# Patient Record
Sex: MALE | Employment: FULL TIME | URBAN - METROPOLITAN AREA
[De-identification: names, ages, dates, MRNs, and addresses within clinical notes are randomized per-mention and may not be internally consistent; named-entity substitution may affect disease eponyms.]

---

## 2024-08-12 ENCOUNTER — APPOINTMENT (OUTPATIENT)
Dept: GENERAL RADIOLOGY | Age: 29
End: 2024-08-12

## 2024-08-12 ENCOUNTER — HOSPITAL ENCOUNTER (EMERGENCY)
Age: 29
Discharge: HOME | End: 2024-08-12

## 2024-08-12 VITALS
BODY MASS INDEX: 33.43 KG/M2 | HEART RATE: 88 BPM | DIASTOLIC BLOOD PRESSURE: 85 MMHG | TEMPERATURE: 98.3 F | OXYGEN SATURATION: 96 % | RESPIRATION RATE: 18 BRPM | HEIGHT: 65 IN | WEIGHT: 200.62 LBS | SYSTOLIC BLOOD PRESSURE: 131 MMHG

## 2024-08-12 DIAGNOSIS — S52.121A CLOSED DISPLACED FRACTURE OF HEAD OF RIGHT RADIUS, INITIAL ENCOUNTER: Primary | ICD-10-CM

## 2024-08-12 DIAGNOSIS — S52.691A OTHER CLOSED FRACTURE OF DISTAL END OF RIGHT ULNA, INITIAL ENCOUNTER: ICD-10-CM

## 2024-08-12 PROCEDURE — 99283 EMERGENCY DEPT VISIT LOW MDM: CPT

## 2024-08-12 PROCEDURE — 29125 APPL SHORT ARM SPLINT STATIC: CPT

## 2024-08-12 PROCEDURE — 73110 X-RAY EXAM OF WRIST: CPT

## 2024-08-12 RX ORDER — OXYCODONE HYDROCHLORIDE AND ACETAMINOPHEN 5; 325 MG/1; MG/1
1 TABLET ORAL EVERY 6 HOURS PRN
Qty: 12 TABLET | Refills: 0 | Status: SHIPPED | OUTPATIENT
Start: 2024-08-12 | End: 2024-08-15

## 2024-08-12 RX ORDER — ONDANSETRON 4 MG/1
4 TABLET, ORALLY DISINTEGRATING ORAL 3 TIMES DAILY PRN
Qty: 21 TABLET | Refills: 0 | Status: SHIPPED | OUTPATIENT
Start: 2024-08-12 | End: 2024-08-19

## 2024-08-12 ASSESSMENT — PAIN SCALES - GENERAL: PAINLEVEL_OUTOF10: 8

## 2024-08-12 ASSESSMENT — LIFESTYLE VARIABLES
HOW OFTEN DO YOU HAVE A DRINK CONTAINING ALCOHOL: MONTHLY OR LESS
HOW MANY STANDARD DRINKS CONTAINING ALCOHOL DO YOU HAVE ON A TYPICAL DAY: 1 OR 2

## 2024-08-12 ASSESSMENT — PAIN DESCRIPTION - DESCRIPTORS: DESCRIPTORS: THROBBING

## 2024-08-12 ASSESSMENT — PAIN - FUNCTIONAL ASSESSMENT: PAIN_FUNCTIONAL_ASSESSMENT: 0-10

## 2024-08-12 ASSESSMENT — PAIN DESCRIPTION - ORIENTATION: ORIENTATION: RIGHT

## 2024-08-12 NOTE — ED PROVIDER NOTES
RIGHT (MIN 3 VIEWS)   Final Result   Distal radius and ulnar fracture.         Electronically signed by Danny Phan                   No results for input(s): \"COVID19\" in the last 72 hours.     Voice dictation software was used during the making of this note.  This software is not perfect and grammatical and other typographical errors may be present.  This note has not been completely proofread for errors.       Ximena Sanders PA  08/12/24 1914

## 2024-08-12 NOTE — ED TRIAGE NOTES
Patient arrives with significant other from home. Patient states he wants an x-ray for his right wrist due to a fall that injured his right arm. Patient states he fell yesterday. Patient denies hitting his head and denies loose of consciousness.

## 2024-08-12 NOTE — DISCHARGE INSTRUCTIONS
Seen in the emergency department today for right wrist pain    You broke your distal right radius and your right ulna.    You were placed in a splint.  Keep the splint dry.  Wear it at all times.    You are given a sling.    I have written you prescription for a narcotic pain medication called Endocet.  Do not drink alcohol or drive on this medication this medication will cause grogginess, fogginess, sleepiness.    Take a stool softener if you take narcotic pain medication    You can alternate Tylenol and ibuprofen for your pain    I have written you for Zofran to help with nausea because narcotics sometimes make you nauseous.    [facility] is going to call you tomorrow so that they can see you in the office tomorrow for follow-up.    Return to an emergency department if your splint feels like it has become very tight, return if your fingers are numb, return for worsening of your condition.    Visto hoy en urgencias por dolor en la christina derecha.    Te rompiste el radio distal derecho y el cúbito derecho.    Te pusieron margi férula.  Mantenga la férula seca.  Llévalo en todo momento.    Te dan un cabestrillo.    Le he recetado un analgésico narcótico llamado Endocet.  No katrina alcohol ni conduzca con ari medicamento. Ari medicamento provocará aturdimiento, confusión y somnolencia.    [place] un ablandador de heces si suzie analgésicos narcóticos.    Puedes alternar Tylenol e ibuprofeno para tu dolor.    Le he escrito Zofran para que le ayude con las náuseas porque los narcóticos a veces le provocan náuseas.    [facility] lo llamará mañana para poder verlo en el consultorio mañana para un seguimiento.    Regrese a un departamento de emergencias si siente que serrano férula se lyon vuelto muy apretada, regrese si tiene los dedos entumecidos, regrese si serrano condición empeora.

## 2024-08-13 ENCOUNTER — OFFICE VISIT (OUTPATIENT)
Age: 29
End: 2024-08-13

## 2024-08-13 DIAGNOSIS — S62.101A CLOSED FRACTURE OF RIGHT WRIST, INITIAL ENCOUNTER: Primary | ICD-10-CM

## 2024-08-13 DIAGNOSIS — S52.571A OTHER INTRAARTICULAR FRACTURE OF LOWER END OF RIGHT RADIUS, INITIAL ENCOUNTER FOR CLOSED FRACTURE: ICD-10-CM

## 2024-08-13 PROCEDURE — 99204 OFFICE O/P NEW MOD 45 MIN: CPT | Performed by: NURSE PRACTITIONER

## 2024-08-13 NOTE — PROGRESS NOTES
Orthopaedic Hand Clinic Note    Name: Calvin Lee  Age: 29 y.o.  YOB: 1995  Gender: male  MRN: [id number]      CC: Patient referred for evaluation of right wrist pain    HPI: Calvin Lee is a 29 y.o. male right handed with a chief complaint of   right  wrist pain. The injury occurred 2 days ago when the patient fell down the stairs. The pain is located diffusely about the right wrist. Denies numbness or paresthesias of the fingers. Evaluation at the emergency room has included x-rays. Treatment to date has included sugar-tong splint and sling. Denies loss of consciousness, head injury or neck pain.  He is taking oxycodone for pain.    ROS/Meds/PSH/PMH/FH/SH: I personally reviewed the patients standard intake form.  Pertinents are discussed in the HPI    Physical Examination:  General: Awake and alert.  HEENT: Normocephalic, atraumatic  CV/Pulm: Breathing even and unlabored  Skin: No obvious rashes noted.  Lymphatic: No obvious evidence of lymphedema or lymphadenopathy    Musculoskeletal Exam:  Examination of the right upper extremity demonstrates no open wounds. Negative Tinel's over left carpal tunnel. Sensation is intact throughout, cap refill in all fingers < 5 seconds. Finger motion is limited with  moderate  swelling of the hand and fingers. Tenderness over the distal radius.  Positive  tenderness over the ulnar aspect of the wrist. No tenderness at elbow, shoulder, clavicle or cervical spine.    Dr. Spann has examined the patient    Imaging / Electrodiagnostic Tests:     XR of the right wrist, 3 views, independently reviewed and interpreted.  Patient has a nondisplaced intra-articular distal radius fracture.      Dr. Spann has reviewed x-rays    Assessment:   1. Closed fracture of right wrist, initial encounter    2. Other intraarticular fracture of lower end of right radius, initial encounter for closed fracture        Plan:   We discussed the diagnosis and different

## 2024-08-20 ENCOUNTER — OFFICE VISIT (OUTPATIENT)
Age: 29
End: 2024-08-20

## 2024-08-20 DIAGNOSIS — S62.101A CLOSED FRACTURE OF RIGHT WRIST, INITIAL ENCOUNTER: Primary | ICD-10-CM

## 2024-08-20 PROCEDURE — 99214 OFFICE O/P EST MOD 30 MIN: CPT | Performed by: ORTHOPAEDIC SURGERY

## 2024-08-20 NOTE — PROGRESS NOTES
Orthopaedic Hand Clinic Note    Name: Calvin Lee  Age: 29 y.o.  YOB: 1995  Gender: male  MRN: [id number]      Follow up visit:   1. Closed fracture of right wrist, initial encounter        HPI: Calvin Lee is a 29 y.o. male who is following up for right radius fracture, patient reports much improved pain and swelling.      ROS/Meds/PSH/PMH/FH/SH: I personally reviewed the patients standard intake form.  Pertinents are discussed in the HPI    Physical Examination:  General: Awake and alert.  HEENT: Normocephalic, atraumatic  CV/Pulm: Breathing even and unlabored  Skin: No obvious rashes noted.  Lymphatic: No obvious evidence of lymphedema or lymphadenopathy    Musculoskeletal Examination:  Examination on the right upper extremity demonstrates cap refill < 5 seconds in all fingers, full finger motion, tenderness palpation of the distal radius.    Imaging / Electrodiagnostic Tests:     right Wrist XR: AP, Lateral, Oblique views     Clinical Indication:  1. Closed fracture of right wrist, initial encounter           Report: AP, lateral, oblique x-ray wrist XRs demonstrates comminuted articular fracture of the right distal radius as well as the ulnar styloid base and unchanged alignment from previous x-ray    Impression: as above     Beverly Spann MD         Assessment:   1. Closed fracture of right wrist, initial encounter        Plan:   We discussed the diagnosis and different treatment options. We discussed observation, therapy, antiinflammatory medications and other pertinent treatment modalities.    After discussing in detail the patient elects to proceed with repeat x-ray in 2 weeks, he will continue active and passive finger motion, wrist brace for protection, if in 2 weeks the fracture remains unchanged we may start resuming activities as tolerated.  We discussed open reduction internal fixation surgery given the articular step-off but this is very small and the patient

## 2024-09-04 ENCOUNTER — OFFICE VISIT (OUTPATIENT)
Age: 29
End: 2024-09-04

## 2024-09-04 DIAGNOSIS — S62.101A CLOSED FRACTURE OF RIGHT WRIST, INITIAL ENCOUNTER: Primary | ICD-10-CM

## 2024-09-04 PROCEDURE — 99213 OFFICE O/P EST LOW 20 MIN: CPT | Performed by: ORTHOPAEDIC SURGERY

## 2024-09-04 NOTE — PROGRESS NOTES
Orthopaedic Hand Clinic Note    Name: Calvni Lee  Age: 29 y.o.  YOB: 1995  Gender: male  MRN: [id number]      Follow up visit:   1. Closed fracture of right wrist, initial encounter        HPI: Calvin Lee is a 29 y.o. male who is following up for right distal radius fracture 4 weeks ago, patient reports improved pain and motion.      ROS/Meds/PSH/PMH/FH/SH: I personally reviewed the patients standard intake form.  Pertinents are discussed in the HPI    Physical Examination:  General: Awake and alert.  HEENT: Normocephalic, atraumatic  CV/Pulm: Breathing even and unlabored  Skin: No obvious rashes noted.  Lymphatic: No obvious evidence of lymphedema or lymphadenopathy    Musculoskeletal Examination:  Examination on the right upper extremity demonstrates cap refill < 5 seconds in all fingers, very minimal tenderness palpation of the distal radius, limited wrist motion, full finger motion.    Imaging / Electrodiagnostic Tests:     right Wrist XR: AP, Lateral, Oblique views     Clinical Indication:  1. Closed fracture of right wrist, initial encounter           Report: AP, lateral, oblique x-ray wrist XRs demonstrates comminuted intra-articular fracture of the right distal radius in unchanged alignment from previous x-ray    Impression: as above     Beverly Spann MD         Assessment:   1. Closed fracture of right wrist, initial encounter        Plan:   We discussed the diagnosis and different treatment options. We discussed observation, therapy, antiinflammatory medications and other pertinent treatment modalities.    After discussing in detail the patient elects to proceed with slow progression to activities as tolerated, he may resume work in 2 weeks with no lifting, pushing or pulling more than 10 pounds, after 2 weeks with those restrictions he may resume work without any restrictions, I will reassess the patient in 6 weeks with x-rays.     Patient voiced accordance and

## 2024-12-20 ENCOUNTER — APPOINTMENT (OUTPATIENT)
Dept: GENERAL RADIOLOGY | Age: 29
End: 2024-12-20

## 2024-12-20 ENCOUNTER — HOSPITAL ENCOUNTER (EMERGENCY)
Age: 29
Discharge: HOME | End: 2024-12-20

## 2024-12-20 VITALS
TEMPERATURE: 98.2 F | WEIGHT: 200.62 LBS | DIASTOLIC BLOOD PRESSURE: 70 MMHG | BODY MASS INDEX: 33.43 KG/M2 | SYSTOLIC BLOOD PRESSURE: 124 MMHG | RESPIRATION RATE: 18 BRPM | HEIGHT: 65 IN | HEART RATE: 82 BPM | OXYGEN SATURATION: 98 %

## 2024-12-20 DIAGNOSIS — S92.064G: Primary | ICD-10-CM

## 2024-12-20 PROCEDURE — 73110 X-RAY EXAM OF WRIST: CPT

## 2024-12-20 PROCEDURE — 99283 EMERGENCY DEPT VISIT LOW MDM: CPT

## 2024-12-20 ASSESSMENT — LIFESTYLE VARIABLES
HOW MANY STANDARD DRINKS CONTAINING ALCOHOL DO YOU HAVE ON A TYPICAL DAY: PATIENT DOES NOT DRINK
HOW OFTEN DO YOU HAVE A DRINK CONTAINING ALCOHOL: NEVER

## 2024-12-20 NOTE — ED TRIAGE NOTES
Pt c/o pain to left ,pt had fx in August, states painful since going back to work ,pt instructed by ortho clinic to come to ed for xray

## 2024-12-20 NOTE — DISCHARGE INSTRUCTIONS
You were seen in the emergency department today for pain in your right wrist that you broke in August.    Repeat x-rays today show that you are developing some callus formation which can be expected.    Spoke with the orthopedic person on-call, continue to wear your wrist brace.  You will follow-up with Dr. Spann.  I do recommend that you call their office on Monday morning to schedule an ER follow-up.  Let them know that you went to the ER.    Continue to wear your brace at work.  Continue over-the-counter medications.    Return to the emergency department for severe pain, redness and swelling to your wrist redness streaking up your arm, development of fevers, general worsening of your condition    Usted fue atendido hoy en el departamento de emergencias por un dolor en la christina derecha que se rompió en citlalli.    Las radiografías repetidas hoy muestran que está desarrollando cierta formación de callos, lo cual es de esperar.    Habló con la persona ortopédica de monique y continúe usando serrano muñequera.  Hará un seguimiento con el Dr. Means.  Le recomiendo que llame a serrano consultorio el [facility] por la mañana para programar un seguimiento en la ethan de emergencias.  Hágales saber que fue a emergencias.    Continúe usando serrano aparato ortopédico en el trabajo.  Continúe con los medicamentos de venta laci.    Regrese al departamento de emergencias si presenta dolor intenso, enrojecimiento e hinchazón en la christina, enrojecimiento que sube por el brazo, aparición de fiebre y empeoramiento general de serrano afección.

## 2024-12-20 NOTE — ED PROVIDER NOTES
Emergency Department Provider Note       PCP: No primary care provider on file.   Age: 29 y.o.   Sex: male     DISPOSITION Decision To Discharge 12/20/2024 06:31:52 PM    ICD-10-CM    1. Closed nondisplaced intra-articular fracture of right calcaneus with delayed healing, subsequent encounter  S92.064G           Medical Decision Making     Vital signs reviewed, patient stable, NAD, afebrile, nontoxic in appearance     Patient is neurovascularly intact    Will obtain an x-ray of his right wrist as was requested by Ortho per the patient.    X-ray of right wrist demonstrates some bridging callus formation at the comminuted distal radial intra-articular fracture.  There is stable alignment of the ulnar styloid fracture without evidence of bridging callus formation.    Reached out to orthopedic provider on-call,Jasvir Lopez via [employer] to see if there is anything else he would like done from the emergency department    Nothing else to do from an ER standpoint per orthopedic people on-call.  Mr. Lopez did reach out to attending, Dr. Spence.  Patient is to follow-up with Dr. Spann.    Based on history, physical exam, workup today, no indication for additional imaging or any blood work.  No emergent findings.  Patient is stable and can be discharged home with follow-up to orthopedics.    Discussed workup, treatment, follow-up and return to ED precautions with the patient using .  This conversation was reiterated in discharge paperwork using Google translate and is in [place].    Discharged home in stable condition    ED Course as of 12/20/24 2036   Fri Dec 20, 2024   1804 FINDINGS: Interval bridging callus formation at the comminuted distal radial  intra-articular fracture. Stable alignment of the ulnar styloid fracture without  evidence of bridging callus formation.  IMPRESSION:  Lack of bridging callus formation at the distal ulnar styloid  fracture, suggestive of pseudoarthrosis.  Interval bridging

## 2025-01-13 ENCOUNTER — TELEPHONE (OUTPATIENT)
Dept: ORTHOPEDIC SURGERY | Age: 30
End: 2025-01-13

## 2025-01-13 NOTE — TELEPHONE ENCOUNTER
I scheduled patNancy Jasso for 2/10 rt hand pain glendy Dr Spann saw last August 2024 for rt hand, patient went to ER 12/20 and says needs to be seen ASAP before then,was communicating with patient thru a friend since he can't speak english

## 2025-02-10 ENCOUNTER — OFFICE VISIT (OUTPATIENT)
Age: 30
End: 2025-02-10

## 2025-02-10 DIAGNOSIS — M65.4 DE QUERVAIN'S TENOSYNOVITIS, RIGHT: ICD-10-CM

## 2025-02-10 DIAGNOSIS — S62.101A CLOSED FRACTURE OF RIGHT WRIST, INITIAL ENCOUNTER: Primary | ICD-10-CM

## 2025-02-10 PROCEDURE — 20550 NJX 1 TENDON SHEATH/LIGAMENT: CPT | Performed by: ORTHOPAEDIC SURGERY

## 2025-02-10 PROCEDURE — 99214 OFFICE O/P EST MOD 30 MIN: CPT | Performed by: ORTHOPAEDIC SURGERY

## 2025-02-10 RX ORDER — MELOXICAM 15 MG/1
15 TABLET ORAL DAILY
Qty: 45 TABLET | Refills: 0 | Status: SHIPPED | OUTPATIENT
Start: 2025-02-10 | End: 2025-03-27

## 2025-02-10 RX ORDER — BETAMETHASONE SODIUM PHOSPHATE AND BETAMETHASONE ACETATE 3; 3 MG/ML; MG/ML
6 INJECTION, SUSPENSION INTRA-ARTICULAR; INTRALESIONAL; INTRAMUSCULAR; SOFT TISSUE ONCE
Status: COMPLETED | OUTPATIENT
Start: 2025-02-10 | End: 2025-02-10

## 2025-02-10 RX ADMIN — BETAMETHASONE SODIUM PHOSPHATE AND BETAMETHASONE ACETATE 6 MG: 3; 3 INJECTION, SUSPENSION INTRA-ARTICULAR; INTRALESIONAL; INTRAMUSCULAR; SOFT TISSUE at 13:31

## 2025-02-10 NOTE — PROGRESS NOTES
Orthopaedic Hand Clinic Note    Name: Calvin Lee  Age: 29 y.o.  YOB: 1995  Gender: male  MRN: [id number]      Follow up visit:   1. Closed fracture of right wrist, initial encounter    2. De Quervain's tenosynovitis, right        HPI: Calvin Lee is a 29 y.o. male who is following up for right distal radius fracture 4 months ago, patient reports pain on the radial side of the wrist that interferes with his everyday life.      ROS/Meds/PSH/PMH/FH/SH: I personally reviewed the patients standard intake form.  Pertinents are discussed in the HPI    Physical Examination:  General: Awake and alert.  HEENT: Normocephalic, atraumatic  CV/Pulm: Breathing even and unlabored  Skin: No obvious rashes noted.  Lymphatic: No obvious evidence of lymphedema or lymphadenopathy    Musculoskeletal Examination:  Examination on the right upper extremity demonstrates cap refill < 5 seconds in all fingers, tenderness palpation of the radial styloid with a positive Finkelstein test, no pain at the radiocarpal joint, no pain on the ulnar side of the wrist.    Imaging / Electrodiagnostic Tests:     right Wrist XR: AP, Lateral, Oblique views     Clinical Indication:  1. Closed fracture of right wrist, initial encounter    2. De Quervain's tenosynovitis, right           Report: AP, lateral, oblique x-ray wrist XRs demonstrates healed distal radius fracture with slight loss of radial inclination and radial height, nonunion of the ulnar styloid    Impression: as above     Beverly Spann MD         Assessment:   1. Closed fracture of right wrist, initial encounter    2. De Quervain's tenosynovitis, right        Plan:   We discussed the diagnosis and different treatment options. We discussed observation, therapy, antiinflammatory medications and other pertinent treatment modalities.    After discussing in detail the patient elects to proceed with right first dorsal extensor compartment steroid injection,

## 2025-03-15 ENCOUNTER — APPOINTMENT (OUTPATIENT)
Dept: GENERAL RADIOLOGY | Age: 30
End: 2025-03-15

## 2025-03-15 ENCOUNTER — HOSPITAL ENCOUNTER (EMERGENCY)
Age: 30
Discharge: HOME | End: 2025-03-15

## 2025-03-15 VITALS
HEIGHT: 65 IN | HEART RATE: 97 BPM | RESPIRATION RATE: 16 BRPM | TEMPERATURE: 99 F | WEIGHT: 200.62 LBS | OXYGEN SATURATION: 93 % | BODY MASS INDEX: 33.43 KG/M2 | SYSTOLIC BLOOD PRESSURE: 105 MMHG | DIASTOLIC BLOOD PRESSURE: 68 MMHG

## 2025-03-15 DIAGNOSIS — R05.1 ACUTE COUGH: Primary | ICD-10-CM

## 2025-03-15 DIAGNOSIS — N30.01 ACUTE CYSTITIS WITH HEMATURIA: ICD-10-CM

## 2025-03-15 LAB
APPEARANCE UR: ABNORMAL
BACTERIA URNS QL MICRO: 0 /HPF
BILIRUB UR QL: NEGATIVE
COLOR UR: ABNORMAL
EPI CELLS #/AREA URNS HPF: ABNORMAL /HPF
GLUCOSE UR STRIP.AUTO-MCNC: NEGATIVE MG/DL
HGB UR QL STRIP: ABNORMAL
KETONES UR QL STRIP.AUTO: ABNORMAL MG/DL
LEUKOCYTE ESTERASE UR QL STRIP.AUTO: ABNORMAL
NITRITE UR QL STRIP.AUTO: NEGATIVE
PH UR STRIP: 5.5 (ref 5–9)
PROT UR STRIP-MCNC: 100 MG/DL
RBC #/AREA URNS HPF: >100 /HPF
SP GR UR REFRACTOMETRY: 1.02 (ref 1–1.02)
UROBILINOGEN UR QL STRIP.AUTO: 0.2 EU/DL (ref 0.2–1)
WBC URNS QL MICRO: ABNORMAL /HPF

## 2025-03-15 PROCEDURE — 99284 EMERGENCY DEPT VISIT MOD MDM: CPT

## 2025-03-15 PROCEDURE — 6370000000 HC RX 637 (ALT 250 FOR IP): Performed by: PHYSICIAN ASSISTANT

## 2025-03-15 PROCEDURE — 71046 X-RAY EXAM CHEST 2 VIEWS: CPT

## 2025-03-15 PROCEDURE — 81001 URINALYSIS AUTO W/SCOPE: CPT

## 2025-03-15 RX ORDER — BENZONATATE 100 MG/1
200 CAPSULE ORAL
Status: COMPLETED | OUTPATIENT
Start: 2025-03-15 | End: 2025-03-15

## 2025-03-15 RX ORDER — CEFDINIR 300 MG/1
300 CAPSULE ORAL 2 TIMES DAILY
Qty: 20 CAPSULE | Refills: 0 | Status: SHIPPED | OUTPATIENT
Start: 2025-03-15 | End: 2025-03-25

## 2025-03-15 RX ORDER — BENZONATATE 200 MG/1
200 CAPSULE ORAL 3 TIMES DAILY PRN
Qty: 30 CAPSULE | Refills: 0 | Status: SHIPPED | OUTPATIENT
Start: 2025-03-15 | End: 2025-03-25

## 2025-03-15 RX ORDER — CEFDINIR 300 MG/1
300 CAPSULE ORAL ONCE
Status: COMPLETED | OUTPATIENT
Start: 2025-03-15 | End: 2025-03-15

## 2025-03-15 RX ADMIN — CEFDINIR 300 MG: 300 CAPSULE ORAL at 18:57

## 2025-03-15 RX ADMIN — BENZONATATE 200 MG: 100 CAPSULE ORAL at 18:57

## 2025-03-15 ASSESSMENT — PAIN SCALES - GENERAL: PAINLEVEL_OUTOF10: 1

## 2025-03-15 ASSESSMENT — LIFESTYLE VARIABLES
HOW MANY STANDARD DRINKS CONTAINING ALCOHOL DO YOU HAVE ON A TYPICAL DAY: 5 OR 6
HOW OFTEN DO YOU HAVE A DRINK CONTAINING ALCOHOL: MONTHLY OR LESS

## 2025-03-15 ASSESSMENT — PAIN - FUNCTIONAL ASSESSMENT: PAIN_FUNCTIONAL_ASSESSMENT: NONE - DENIES PAIN

## 2025-03-15 NOTE — ED PROVIDER NOTES
Emergency Department Provider Note       PCP: No primary care provider on file.   Age: 29 y.o.   Sex: male     DISPOSITION Decision To Discharge 03/15/2025 06:42:09 PM   DISPOSITION CONDITION Stable            ICD-10-CM    1. Acute cough  R05.1       2. Acute cystitis with hematuria  N30.01           Medical Decision Making     29-year-old male presents to the ER with complaint of cough for 2 weeks.  Patient states initially he was diagnosed with the flu but since then the cough has persisted and it is productive.  States he did have some fevers about 5 days ago.  Patient reports throat discomfort from all the coughing.  Patient's voice is hoarse.  Patient states he has not been feeling good and not drinking or eating like he normally does.  States his urine has become dark.  Patient denies any pain with urination.  States he has been having some diarrhea since he has been sick.  Patient denies nausea or vomiting, abdominal pain or other symptoms.    Chest x-ray shows atelectasis but no pneumonia.  Urinalysis reveals blood and leukocytes.  Will treat patient with cefdinir and Tessalon Perles.  Discussed the importance of drinking at least 8 to 10 cups of water a day.  Discussed follow-up if the blood in his urine does not improve in the next 3 or 4 days.  Patient verbalized understanding and agrees with plan.  Utilized the language line to discuss results, plan and follow-up with patient.     1 acute complicated illness or injury.  Prescription drug management performed.  Shared medical decision making was utilized in creating the patients health plan today.  I independently ordered and reviewed each unique test.                         History     29-year-old male presents to the ER with complaint of cough for 2 weeks.  Patient states initially he was diagnosed with the flu but since then the cough has persisted and it is productive.  States he did have some fevers about 5 days ago.  Patient reports throat  discomfort from all the coughing.  Patient's voice is hoarse.  Patient states he has not been feeling good and not drinking or eating like he normally does.  States his urine has become dark.  Patient denies any pain with urination.  States he has been having some diarrhea since he has been sick.  Patient denies nausea or vomiting, abdominal pain or other symptoms.    The history is provided by the patient. A  was used.     Physical Exam     Vitals signs and nursing note reviewed:  Vitals:    03/15/25 1658 03/15/25 1843   BP: 118/67 105/68   Pulse: 90 97   Resp: 16 16   Temp: 99 °F (37.2 °C)    SpO2: 93% 93%   Weight: 91 kg (200 lb 9.9 oz)    Height: 1.65 m (5' 4.96\")       Physical Exam  Vitals and nursing note reviewed.   Constitutional:       General: He is not in acute distress.     Appearance: Normal appearance. He is not toxic-appearing.   HENT:      Head: Normocephalic and atraumatic.      Nose: Nose normal.      Mouth/Throat:      Mouth: Mucous membranes are moist.      Pharynx: Oropharynx is clear.   Eyes:      Conjunctiva/sclera: Conjunctivae normal.      Pupils: Pupils are equal, round, and reactive to light.   Cardiovascular:      Rate and Rhythm: Normal rate and regular rhythm.      Heart sounds: Normal heart sounds.   Pulmonary:      Effort: Pulmonary effort is normal. No respiratory distress.      Breath sounds: Normal breath sounds. No stridor. No wheezing, rhonchi or rales.   Abdominal:      General: Bowel sounds are normal. There is no distension.      Palpations: Abdomen is soft.      Tenderness: There is no abdominal tenderness. There is no guarding.   Musculoskeletal:         General: No swelling. Normal range of motion.      Cervical back: Normal range of motion and neck supple. No rigidity.   Skin:     General: Skin is warm and dry.      Findings: No rash.   Neurological:      General: No focal deficit present.      Mental Status: He is alert and oriented to person, place,

## 2025-03-15 NOTE — ED TRIAGE NOTES
Patient was sick 15 days ago and since then he has had a cough. He has been taking home remedies like honey but they are not working. Cough has been productive with yellow flem. It it is much worse at night. He is coughing so much his ribs are sore and his back hurts.     He also has been having very dark urine. He is not sure what is causing that. No pain with urination.

## 2025-03-15 NOTE — DISCHARGE INSTRUCTIONS
Drink at least 8-10 cups of water a day. Take Tylenol and Ibuprofen as needed for pain/fever.    Take medication as prescribed.    Call, make an appointment and follow up with your doctor in 1-2 days for a recheck.    Return to ER for worsening symptoms, chest pain, shortness of breath or any other concerns.